# Patient Record
Sex: MALE | Race: BLACK OR AFRICAN AMERICAN | NOT HISPANIC OR LATINO | Employment: FULL TIME | URBAN - METROPOLITAN AREA
[De-identification: names, ages, dates, MRNs, and addresses within clinical notes are randomized per-mention and may not be internally consistent; named-entity substitution may affect disease eponyms.]

---

## 2022-12-19 ENCOUNTER — OFFICE VISIT (OUTPATIENT)
Dept: URGENT CARE | Facility: CLINIC | Age: 67
End: 2022-12-19
Payer: OTHER MISCELLANEOUS

## 2022-12-19 VITALS
RESPIRATION RATE: 16 BRPM | HEART RATE: 77 BPM | HEIGHT: 74 IN | WEIGHT: 182.81 LBS | DIASTOLIC BLOOD PRESSURE: 63 MMHG | OXYGEN SATURATION: 100 % | TEMPERATURE: 97 F | SYSTOLIC BLOOD PRESSURE: 96 MMHG | BODY MASS INDEX: 23.46 KG/M2

## 2022-12-19 DIAGNOSIS — Z02.6 ENCOUNTER RELATED TO WORKER'S COMPENSATION CLAIM: Primary | ICD-10-CM

## 2022-12-19 DIAGNOSIS — S63.612A SPRAIN OF RIGHT MIDDLE FINGER, UNSPECIFIED SITE OF DIGIT, INITIAL ENCOUNTER: ICD-10-CM

## 2022-12-19 DIAGNOSIS — M79.644 PAIN OF FINGER OF RIGHT HAND: ICD-10-CM

## 2022-12-19 LAB
COLLECTION ONLY: NORMAL
CTP QC/QA: YES
POC 10 PANEL DRUG SCREEN: NEGATIVE

## 2022-12-19 PROCEDURE — 99213 PR OFFICE/OUTPT VISIT, EST, LEVL III, 20-29 MIN: ICD-10-PCS | Mod: S$GLB,,,

## 2022-12-19 PROCEDURE — 80305 DRUG TEST PRSMV DIR OPT OBS: CPT | Mod: QW,S$GLB,,

## 2022-12-19 PROCEDURE — 99213 OFFICE O/P EST LOW 20 MIN: CPT | Mod: S$GLB,,,

## 2022-12-19 PROCEDURE — 73140 X-RAY EXAM OF FINGER(S): CPT | Mod: RT,S$GLB,, | Performed by: RADIOLOGY

## 2022-12-19 PROCEDURE — 73140 XR FINGER 2 OR MORE VIEWS RIGHT: ICD-10-PCS | Mod: RT,S$GLB,, | Performed by: RADIOLOGY

## 2022-12-19 PROCEDURE — 80305 PR DRUG TEST, PRESUMP,ANY NUMBER OF CLASS, DIRECT OPTICAL OBS: ICD-10-PCS | Mod: QW,S$GLB,,

## 2022-12-19 RX ORDER — MELOXICAM 7.5 MG/1
7.5 TABLET ORAL DAILY
Qty: 7 TABLET | Refills: 0 | Status: SHIPPED | OUTPATIENT
Start: 2022-12-19 | End: 2022-12-19

## 2022-12-19 RX ORDER — EZETIMIBE 10 MG/1
10 TABLET ORAL
COMMUNITY
Start: 2022-09-23

## 2022-12-19 RX ORDER — DICLOFENAC SODIUM 10 MG/G
2 GEL TOPICAL DAILY
Qty: 20 G | Refills: 0 | Status: SHIPPED | OUTPATIENT
Start: 2022-12-19 | End: 2022-12-26

## 2022-12-19 RX ORDER — RIVAROXABAN 2.5 MG/1
1 TABLET, FILM COATED ORAL 2 TIMES DAILY
COMMUNITY
Start: 2022-08-01

## 2022-12-19 RX ORDER — FERROUS SULFATE 325(65) MG
TABLET, DELAYED RELEASE (ENTERIC COATED) ORAL
COMMUNITY
Start: 2022-07-12

## 2022-12-19 NOTE — PROGRESS NOTES
"Subjective:       Patient ID: Rahul Landa Jr. is a 67 y.o. male.    Vitals:  height is 6' 2" (1.88 m) and weight is 82.9 kg (182 lb 12.8 oz). His oral temperature is 97 °F (36.1 °C). His blood pressure is 96/63 and his pulse is 77. His respiration is 16 and oxygen saturation is 100%.     Chief Complaint: Work Related Injury    W/C: DOI: 12-19-22 Initial Visit:  Pt states "Went to  a bucket and tried to let it go and right hand middle finger got stuck."      Constitution: Negative for activity change, appetite change, chills, sweating, fever and unexpected weight change.   HENT:  Negative for ear pain, postnasal drip, sinus pain, sinus pressure and sore throat.    Cardiovascular:  Negative for chest pain.   Eyes:  Negative for blurred vision.   Respiratory:  Negative for chest tightness, cough and shortness of breath.    Gastrointestinal:  Negative for abdominal pain.   Musculoskeletal:  Positive for pain (right middle finger pain).   Neurological:  Negative for dizziness, history of vertigo and altered mental status.   Psychiatric/Behavioral:  Negative for altered mental status.      Objective:      Physical Exam   Constitutional: He is oriented to person, place, and time.  Non-toxic appearance. He does not appear ill. No distress.   HENT:   Head: Normocephalic.   Eyes: Conjunctivae are normal. Extraocular movement intact   Cardiovascular: Normal rate and normal pulses.   Pulmonary/Chest: Effort normal and breath sounds normal. No respiratory distress. He has no wheezes. He has no rhonchi.   Neurological: no focal deficit. He is alert and oriented to person, place, and time.   Skin: Skin is not diaphoretic. Capillary refill takes 2 to 3 seconds.   Psychiatric: His behavior is normal. Mood normal.     Pt able to make fist with right hand, has pain with extension of right middle finger, right middle finger fds,fdp tendon intact and able to fully flexion, extensor tendon limited ability to fully straight " finger out due to increased pain, no obvious dislocation noted on exam, sensation intact, normal cap refill, no swelling noted.   Assessment:       1. Encounter related to worker's compensation claim    2. Pain of finger of right hand    3. Sprain of right middle finger, unspecified site of digit, initial encounter          Xray right middle finger: FINDINGS:  There is no fracture or dislocation.  Soft tissues are unremarkable.   Impression:No acute osseous abnormality  Plan:         Encounter related to worker's compensation claim  -     POCT Rapid Drug Screen 10 Panel    Pain of finger of right hand  -     X-Ray Finger 2 or More Views Right; Future; Expected date: 12/19/2022  -     Ambulatory referral/consult to Orthopedics  -     Discontinue: meloxicam (MOBIC) 7.5 MG tablet; Take 1 tablet (7.5 mg total) by mouth once daily. for 7 days  Dispense: 7 tablet; Refill: 0  -     diclofenac sodium (VOLTAREN) 1 % Gel; Apply 2 g topically once daily. for 7 days  Dispense: 20 g; Refill: 0    Sprain of right middle finger, unspecified site of digit, initial encounter  -     Ambulatory referral/consult to Orthopedics  -     Discontinue: meloxicam (MOBIC) 7.5 MG tablet; Take 1 tablet (7.5 mg total) by mouth once daily. for 7 days  Dispense: 7 tablet; Refill: 0  -     diclofenac sodium (VOLTAREN) 1 % Gel; Apply 2 g topically once daily. for 7 days  Dispense: 20 g; Refill: 0    Pt presents with right middle finger pain and difficulty with fully extension of right middle finger, states he was lifting buckets of paint earlier and noticed he had limited ROM of middle finger after, upon exam he is able to fully flex middle finger and make fist, he cannot fully extend middle finger, low suspicion for jersey finger or mallet finger, finger placed in finger splint for comfort, will do a trail of RICE and Voltaren gel, pt will return to clinic on 12/23/2022 to re-evaluate, ortho hand referral placed If symptoms persist.

## 2022-12-19 NOTE — LETTER
[facility]  [address] [address]  Phone: [phone]  [place] [employer]: [phone]    Pt Name: Rahul Landa Jr.  Injury Date: 12/19/2022   Employee ID:     Company: [employer]      Appointment Time: 10:50 AM Arrived: 11:10   Provider: Kar Manzano NP Time Out: 1:30     Office Treatment:   1. Encounter related to worker's compensation claim    2. Pain of finger of right hand    3. Sprain of right middle finger, unspecified site of digit, initial encounter      Medications Ordered This Encounter   Medications    diclofenac sodium (VOLTAREN) 1 % Gel      Patient Instructions: Use splint as directed, Apply ice 24-48 hours then apply heat/warm soaks, Elevated affected area, Attention not to aggravate affected area    Restrictions: No lifting/pushing/pulling more than 10 lbs, Limited use of right hand and arm     Return Appointment: 12/23/2022

## 2022-12-19 NOTE — PATIENT INSTRUCTIONS
Wear finger splint as needed for pain relief.   Rest, ice,elevate right finger.  Attention not to aggravate.   No lifting greater than 10 lbs with right hand/arm.  Return to clinic on 12/23/2022 to re-evaluate. Sooner if symptoms worsen.  Follow up with orthopedics as directed.

## 2023-08-30 ENCOUNTER — OFFICE VISIT (OUTPATIENT)
Dept: PRIMARY CARE CLINIC | Facility: CLINIC | Age: 68
End: 2023-08-30
Payer: COMMERCIAL

## 2023-08-30 VITALS
TEMPERATURE: 98 F | SYSTOLIC BLOOD PRESSURE: 141 MMHG | HEART RATE: 58 BPM | BODY MASS INDEX: 22.93 KG/M2 | OXYGEN SATURATION: 99 % | DIASTOLIC BLOOD PRESSURE: 84 MMHG | WEIGHT: 178.69 LBS | HEIGHT: 74 IN

## 2023-08-30 DIAGNOSIS — J01.00 ACUTE MAXILLARY SINUSITIS, RECURRENCE NOT SPECIFIED: Primary | ICD-10-CM

## 2023-08-30 DIAGNOSIS — R05.9 COUGH IN ADULT: ICD-10-CM

## 2023-08-30 PROCEDURE — 99213 OFFICE O/P EST LOW 20 MIN: CPT | Mod: S$GLB,,, | Performed by: NURSE PRACTITIONER

## 2023-08-30 PROCEDURE — 99213 PR OFFICE/OUTPT VISIT, EST, LEVL III, 20-29 MIN: ICD-10-PCS | Mod: S$GLB,,, | Performed by: NURSE PRACTITIONER

## 2023-08-30 PROCEDURE — 99999 PR PBB SHADOW E&M-EST. PATIENT-LVL IV: ICD-10-PCS | Mod: PBBFAC,,, | Performed by: NURSE PRACTITIONER

## 2023-08-30 PROCEDURE — 99999 PR PBB SHADOW E&M-EST. PATIENT-LVL IV: CPT | Mod: PBBFAC,,, | Performed by: NURSE PRACTITIONER

## 2023-08-30 RX ORDER — CETIRIZINE HYDROCHLORIDE 10 MG/1
10 TABLET ORAL NIGHTLY
Qty: 30 TABLET | Refills: 0 | Status: SHIPPED | OUTPATIENT
Start: 2023-08-30 | End: 2023-09-29

## 2023-08-30 RX ORDER — COVID-19 ANTIGEN TEST
1 KIT MISCELLANEOUS
Qty: 4 EACH | Refills: 1 | Status: SHIPPED | OUTPATIENT
Start: 2023-08-30

## 2023-08-30 RX ORDER — BENZONATATE 200 MG/1
200 CAPSULE ORAL 3 TIMES DAILY PRN
Qty: 20 CAPSULE | Refills: 0 | Status: SHIPPED | OUTPATIENT
Start: 2023-08-30 | End: 2023-09-09

## 2023-08-30 RX ORDER — AMOXICILLIN AND CLAVULANATE POTASSIUM 875; 125 MG/1; MG/1
1 TABLET, FILM COATED ORAL 2 TIMES DAILY
Qty: 10 TABLET | Refills: 0 | Status: SHIPPED | OUTPATIENT
Start: 2023-08-30 | End: 2023-09-04

## 2023-08-30 NOTE — PROGRESS NOTES
Subjective:       Patient ID: Rahul Landa Jr. is a 68 y.o. male.    Chief Complaint: Chest Congestion and Cough (/)    Mr. Rahul Landa is a 68 year old male, new to me, presents to the clinic with congestion and cough. No PCP. Medical and surgical history in addition to problem list reviewed as listed below.     Sinusitis  This is a new problem. The current episode started 1 to 4 weeks ago (negative Covid test on Monday, works at facility with elderly population). The problem has been gradually worsening since onset. There has been no fever. His pain is at a severity of 7/10. The pain is moderate. Associated symptoms include congestion, ear pain, a hoarse voice, neck pain, shortness of breath, sinus pressure, sneezing and a sore throat. Pertinent negatives include no headaches. Treatments tried: mucinex. The treatment provided no relief.       Past Medical History:   Diagnosis Date    Hypertension         History reviewed. No pertinent surgical history.     Family History   Problem Relation Age of Onset    Hypertension Mother     Diabetes Father        Social History     Tobacco Use   Smoking Status Light Smoker   Smokeless Tobacco Not on file       Social History     Social History Narrative    Not on file       Review of patient's allergies indicates:  No Known Allergies     Review of Systems   Constitutional:  Positive for fatigue.   HENT:  Positive for congestion, ear pain, hoarse voice, sinus pressure, sneezing and sore throat.    Respiratory:  Positive for shortness of breath.    Cardiovascular:  Negative for palpitations.   Gastrointestinal:  Negative for constipation, diarrhea, nausea and vomiting.   Musculoskeletal:  Positive for neck pain.   Neurological:  Negative for dizziness, numbness and headaches.         Objective:      Vitals:    08/30/23 1357   BP: (!) 141/84   BP Location: Left arm   Patient Position: Sitting   BP Method: Small (Automatic)   Pulse: (!) 58   Temp: 98.4 °F (36.9 °C)  "  TempSrc: Oral   SpO2: 99%   Weight: 81.1 kg (178 lb 10.9 oz)   Height: 6' 2" (1.88 m)        Physical Exam  Constitutional:       Appearance: He is ill-appearing.   HENT:      Nose: Congestion and rhinorrhea present.      Right Turbinates: Swollen.      Left Turbinates: Swollen.      Right Sinus: Maxillary sinus tenderness present.      Left Sinus: Maxillary sinus tenderness present.   Pulmonary:      Effort: Pulmonary effort is normal. No respiratory distress.      Breath sounds: No wheezing or rales.   Musculoskeletal:         General: Normal range of motion.      Cervical back: Normal range of motion.   Skin:     General: Skin is warm and dry.      Capillary Refill: Capillary refill takes 2 to 3 seconds.   Neurological:      Mental Status: He is alert and oriented to person, place, and time.         Assessment:       1. Acute maxillary sinusitis, recurrence not specified    2. Cough in adult        Plan:       Acute maxillary sinusitis, recurrence not specified  -     amoxicillin-clavulanate 875-125mg (AUGMENTIN) 875-125 mg per tablet; Take 1 tablet by mouth 2 (two) times daily. for 5 days  Dispense: 10 tablet; Refill: 0  -     cetirizine (ZYRTEC) 10 MG tablet; Take 1 tablet (10 mg total) by mouth every evening.  Dispense: 30 tablet; Refill: 0    Cough in adult  -     benzonatate (TESSALON) 200 MG capsule; Take 1 capsule (200 mg total) by mouth 3 (three) times daily as needed for Cough.  Dispense: 20 capsule; Refill: 0    Other orders  -     COVID-19 antigen test (COVID-19 AT-HOME TEST) Kit; 1 Application by Misc.(Non-Drug; Combo Route) route as needed (covid testing).  Dispense: 4 each; Refill: 1    Recommend swabbing at home for Covid once test retrieved from pharmacy with Covid on the rise and daily population interaction.    Medication List with Changes/Refills   New Medications    AMOXICILLIN-CLAVULANATE 875-125MG (AUGMENTIN) 875-125 MG PER TABLET    Take 1 tablet by mouth 2 (two) times daily. for 5 days "    BENZONATATE (TESSALON) 200 MG CAPSULE    Take 1 capsule (200 mg total) by mouth 3 (three) times daily as needed for Cough.    CETIRIZINE (ZYRTEC) 10 MG TABLET    Take 1 tablet (10 mg total) by mouth every evening.    COVID-19 ANTIGEN TEST (COVID-19 AT-HOME TEST) KIT    1 Application by Misc.(Non-Drug; Combo Route) route as needed (covid testing).   Current Medications    ASPIRIN (ECOTRIN) 81 MG EC TABLET    Take 1 tablet (81 mg total) by mouth once daily.    ATORVASTATIN (LIPITOR) 40 MG TABLET    Take 1 tablet (40 mg total) by mouth once daily.    BACLOFEN (LIORESAL) 10 MG TABLET    Take 10 mg by mouth 3 (three) times daily.    DICLOFENAC SODIUM (VOLTAREN) 1 % GEL    Apply 2 g topically once daily. for 7 days    DULOXETINE (CYMBALTA) 20 MG CAPSULE    Take 20 mg by mouth once daily.    EZETIMIBE (ZETIA) 10 MG TABLET    Take 10 mg by mouth.    FERROUS SULFATE 325 (65 FE) MG EC TABLET    Take by mouth.    VALSARTAN (DIOVAN) 80 MG TABLET    Take 80 mg by mouth once daily.    XARELTO 2.5 MG TAB    Take 1 tablet by mouth 2 (two) times daily.        Follow up if symptoms worsen or fail to improve.    NORMA Nino, MSN, FNP-C

## 2023-12-14 ENCOUNTER — OFFICE VISIT (OUTPATIENT)
Dept: URGENT CARE | Facility: CLINIC | Age: 68
End: 2023-12-14
Payer: COMMERCIAL

## 2023-12-14 VITALS
RESPIRATION RATE: 16 BRPM | SYSTOLIC BLOOD PRESSURE: 140 MMHG | WEIGHT: 175.63 LBS | DIASTOLIC BLOOD PRESSURE: 74 MMHG | TEMPERATURE: 97 F | HEIGHT: 74 IN | HEART RATE: 59 BPM | OXYGEN SATURATION: 97 % | BODY MASS INDEX: 22.54 KG/M2

## 2023-12-14 DIAGNOSIS — M54.50 ACUTE MIDLINE LOW BACK PAIN WITHOUT SCIATICA: Primary | ICD-10-CM

## 2023-12-14 PROCEDURE — 96372 THER/PROPH/DIAG INJ SC/IM: CPT | Mod: S$GLB,,,

## 2023-12-14 PROCEDURE — 96372 PR INJECTION,THERAP/PROPH/DIAG2ST, IM OR SUBCUT: ICD-10-PCS | Mod: S$GLB,,,

## 2023-12-14 PROCEDURE — 99214 OFFICE O/P EST MOD 30 MIN: CPT | Mod: 25,S$GLB,,

## 2023-12-14 PROCEDURE — 99214 PR OFFICE/OUTPT VISIT, EST, LEVL IV, 30-39 MIN: ICD-10-PCS | Mod: 25,S$GLB,,

## 2023-12-14 RX ORDER — DEXAMETHASONE SODIUM PHOSPHATE 4 MG/ML
8 INJECTION, SOLUTION INTRA-ARTICULAR; INTRALESIONAL; INTRAMUSCULAR; INTRAVENOUS; SOFT TISSUE
Status: COMPLETED | OUTPATIENT
Start: 2023-12-14 | End: 2023-12-14

## 2023-12-14 RX ORDER — KETOROLAC TROMETHAMINE 30 MG/ML
30 INJECTION, SOLUTION INTRAMUSCULAR; INTRAVENOUS
Status: COMPLETED | OUTPATIENT
Start: 2023-12-14 | End: 2023-12-14

## 2023-12-14 RX ORDER — PREDNISONE 20 MG/1
20 TABLET ORAL 2 TIMES DAILY
Qty: 10 TABLET | Refills: 0 | Status: SHIPPED | OUTPATIENT
Start: 2023-12-14 | End: 2023-12-19

## 2023-12-14 RX ORDER — CYCLOBENZAPRINE HCL 10 MG
10 TABLET ORAL 3 TIMES DAILY PRN
Qty: 30 TABLET | Refills: 0 | Status: SHIPPED | OUTPATIENT
Start: 2023-12-14 | End: 2023-12-24

## 2023-12-14 RX ADMIN — DEXAMETHASONE SODIUM PHOSPHATE 8 MG: 4 INJECTION, SOLUTION INTRA-ARTICULAR; INTRALESIONAL; INTRAMUSCULAR; INTRAVENOUS; SOFT TISSUE at 04:12

## 2023-12-14 RX ADMIN — KETOROLAC TROMETHAMINE 30 MG: 30 INJECTION, SOLUTION INTRAMUSCULAR; INTRAVENOUS at 04:12

## 2023-12-14 NOTE — PATIENT INSTRUCTIONS
Begin steroids tomorrow  Take Tylenol or Motrin as needed for pain  Follow up with PCP if pain persist  ER precautions for any loss of bowel or bladder function or unable to walk

## 2023-12-14 NOTE — PROGRESS NOTES
"Subjective:      Patient ID: Rahul Landa Jr. is a 68 y.o. male.    Vitals:  height is 6' 2" (1.88 m) and weight is 79.7 kg (175 lb 9.6 oz). His oral temperature is 97.4 °F (36.3 °C). His blood pressure is 140/74 (abnormal) and his pulse is 59 (abnormal). His respiration is 16 and oxygen saturation is 97%.     Chief Complaint: Back Pain    Mr. Kay, past medical history of hypertension, presents to clinic with acute onset lower back pain.  Patient reports he was painting, as he was going to stand up he had a sudden sharp pain in his midline lower back.  Reports he has had similar injury in the past.  Reports previous injury was caused by carrying multiple 5 gal buckets of paint up steps.  He has never had a follow up from that injury.  Denies recurrent injuries.  Denies saddle paresthesias.  Denies bowel or bladder dysfunction.  Pain is worse when trying to get out of the seated position.  Denies pain radiating to lower extremities.    Back Pain  This is a new problem. The current episode started today. The problem has been gradually worsening since onset. The pain is present in the lumbar spine. The quality of the pain is described as aching. The pain does not radiate. The pain is at a severity of 10/10. The symptoms are aggravated by bending, position, standing and sitting. Pertinent negatives include no bladder incontinence, bowel incontinence, paresis, paresthesias, pelvic pain or perianal numbness. Treatments tried: Tylenol, Asprin. The treatment provided no relief.       Gastrointestinal:  Negative for bowel incontinence.   Genitourinary:  Negative for bladder incontinence and pelvic pain.   Musculoskeletal:  Positive for back pain, pain with walking and history of spine disorder.   Skin:  Negative for rash.      Objective:     Physical Exam   Constitutional: He is oriented to person, place, and time. He appears well-developed. He is cooperative.  Non-toxic appearance. He does not appear ill. No distress. " normal  HENT:   Head: Normocephalic and atraumatic.   Ears:   Right Ear: Hearing, tympanic membrane, external ear and ear canal normal.   Left Ear: Hearing, tympanic membrane, external ear and ear canal normal.   Nose: Nose normal. No mucosal edema or nasal deformity. No epistaxis. Right sinus exhibits no maxillary sinus tenderness and no frontal sinus tenderness. Left sinus exhibits no maxillary sinus tenderness and no frontal sinus tenderness.   Mouth/Throat: Uvula is midline, oropharynx is clear and moist and mucous membranes are normal. Mucous membranes are moist. No trismus in the jaw. Normal dentition. No uvula swelling. Oropharynx is clear.   Eyes: Conjunctivae and lids are normal. Pupils are equal, round, and reactive to light. Extraocular movement intact   Neck: Trachea normal and phonation normal. Neck supple.   Cardiovascular: Normal rate, regular rhythm, normal heart sounds and normal pulses.   Pulmonary/Chest: Effort normal and breath sounds normal.   Abdominal: Normal appearance. Soft. flat abdomen   Musculoskeletal:         General: Tenderness present.      Lumbar back: He exhibits decreased range of motion, bony tenderness and spasm.        Back:    Neurological: no focal deficit. He is alert, oriented to person, place, and time and at baseline. He exhibits normal muscle tone.   Skin: Skin is warm, dry and intact. Capillary refill takes 2 to 3 seconds.   Psychiatric: His speech is normal and behavior is normal. Mood, judgment and thought content normal.   Nursing note and vitals reviewed.      Assessment:     1. Acute midline low back pain without sciatica        Plan:       Acute midline low back pain without sciatica  -     ketorolac injection 30 mg  -     dexAMETHasone injection 8 mg  -     predniSONE (DELTASONE) 20 MG tablet; Take 1 tablet (20 mg total) by mouth 2 (two) times daily. for 5 days  Dispense: 10 tablet; Refill: 0  -     cyclobenzaprine (FLEXERIL) 10 MG tablet; Take 1 tablet (10 mg  total) by mouth 3 (three) times daily as needed for Muscle spasms.  Dispense: 30 tablet; Refill: 0

## 2024-06-14 ENCOUNTER — LAB VISIT (OUTPATIENT)
Dept: LAB | Facility: HOSPITAL | Age: 69
End: 2024-06-14
Attending: NURSE PRACTITIONER

## 2024-06-14 ENCOUNTER — OFFICE VISIT (OUTPATIENT)
Dept: FAMILY MEDICINE | Facility: CLINIC | Age: 69
End: 2024-06-14

## 2024-06-14 VITALS
HEIGHT: 74 IN | SYSTOLIC BLOOD PRESSURE: 104 MMHG | BODY MASS INDEX: 22.95 KG/M2 | OXYGEN SATURATION: 97 % | WEIGHT: 178.81 LBS | TEMPERATURE: 98 F | HEART RATE: 69 BPM | DIASTOLIC BLOOD PRESSURE: 70 MMHG

## 2024-06-14 DIAGNOSIS — Z01.818 PRE-OP EVALUATION: ICD-10-CM

## 2024-06-14 DIAGNOSIS — H26.9 CATARACT, UNSPECIFIED CATARACT TYPE, UNSPECIFIED LATERALITY: ICD-10-CM

## 2024-06-14 DIAGNOSIS — Z01.818 PRE-OP EVALUATION: Primary | ICD-10-CM

## 2024-06-14 LAB
ALBUMIN SERPL BCP-MCNC: 3.9 G/DL (ref 3.5–5.2)
ALP SERPL-CCNC: 57 U/L (ref 55–135)
ALT SERPL W/O P-5'-P-CCNC: 11 U/L (ref 10–44)
ANION GAP SERPL CALC-SCNC: 7 MMOL/L (ref 8–16)
AST SERPL-CCNC: 17 U/L (ref 10–40)
BASOPHILS # BLD AUTO: 0.06 K/UL (ref 0–0.2)
BASOPHILS NFR BLD: 1.5 % (ref 0–1.9)
BILIRUB SERPL-MCNC: 0.7 MG/DL (ref 0.1–1)
BUN SERPL-MCNC: 16 MG/DL (ref 8–23)
CALCIUM SERPL-MCNC: 9.6 MG/DL (ref 8.7–10.5)
CHLORIDE SERPL-SCNC: 105 MMOL/L (ref 95–110)
CO2 SERPL-SCNC: 25 MMOL/L (ref 23–29)
CREAT SERPL-MCNC: 1.3 MG/DL (ref 0.5–1.4)
DIFFERENTIAL METHOD BLD: ABNORMAL
EOSINOPHIL # BLD AUTO: 0.1 K/UL (ref 0–0.5)
EOSINOPHIL NFR BLD: 2 % (ref 0–8)
ERYTHROCYTE [DISTWIDTH] IN BLOOD BY AUTOMATED COUNT: 13.6 % (ref 11.5–14.5)
EST. GFR  (NO RACE VARIABLE): 58.7 ML/MIN/1.73 M^2
GLUCOSE SERPL-MCNC: 80 MG/DL (ref 70–110)
HCT VFR BLD AUTO: 39.7 % (ref 40–54)
HGB BLD-MCNC: 13.3 G/DL (ref 14–18)
IMM GRANULOCYTES # BLD AUTO: 0.01 K/UL (ref 0–0.04)
IMM GRANULOCYTES NFR BLD AUTO: 0.3 % (ref 0–0.5)
LYMPHOCYTES # BLD AUTO: 1.4 K/UL (ref 1–4.8)
LYMPHOCYTES NFR BLD: 34.8 % (ref 18–48)
MCH RBC QN AUTO: 30 PG (ref 27–31)
MCHC RBC AUTO-ENTMCNC: 33.5 G/DL (ref 32–36)
MCV RBC AUTO: 89 FL (ref 82–98)
MONOCYTES # BLD AUTO: 0.5 K/UL (ref 0.3–1)
MONOCYTES NFR BLD: 11.3 % (ref 4–15)
NEUTROPHILS # BLD AUTO: 2 K/UL (ref 1.8–7.7)
NEUTROPHILS NFR BLD: 50.1 % (ref 38–73)
NRBC BLD-RTO: 0 /100 WBC
PLATELET # BLD AUTO: 305 K/UL (ref 150–450)
PMV BLD AUTO: 9.2 FL (ref 9.2–12.9)
POTASSIUM SERPL-SCNC: 4.3 MMOL/L (ref 3.5–5.1)
PROT SERPL-MCNC: 7.6 G/DL (ref 6–8.4)
RBC # BLD AUTO: 4.44 M/UL (ref 4.6–6.2)
SODIUM SERPL-SCNC: 137 MMOL/L (ref 136–145)
WBC # BLD AUTO: 4 K/UL (ref 3.9–12.7)

## 2024-06-14 PROCEDURE — 99999 PR PBB SHADOW E&M-EST. PATIENT-LVL IV: CPT | Mod: PBBFAC,,, | Performed by: NURSE PRACTITIONER

## 2024-06-14 PROCEDURE — 99214 OFFICE O/P EST MOD 30 MIN: CPT | Mod: PBBFAC,PO | Performed by: NURSE PRACTITIONER

## 2024-06-14 PROCEDURE — 85025 COMPLETE CBC W/AUTO DIFF WBC: CPT | Performed by: NURSE PRACTITIONER

## 2024-06-14 PROCEDURE — 36415 COLL VENOUS BLD VENIPUNCTURE: CPT | Mod: PO | Performed by: NURSE PRACTITIONER

## 2024-06-14 PROCEDURE — 80053 COMPREHEN METABOLIC PANEL: CPT | Performed by: NURSE PRACTITIONER

## 2024-06-14 NOTE — PROGRESS NOTES
Subjective:       Patient ID: Rahul Landa Jr. is a 69 y.o. male.    Chief Complaint: Pre-op Exam     HPI   68 y/o male patient with medical problems listed below presents for pre op of right cataract surgery scheduled on 6/19/2024 at [facility] [place]. No acute complaints reported. Patient is new to the clinic. Patient  goes to [facility] for primary care, last seen by pcp approximately 6 months ago and states he is unsure of when was the last time he had labs done. States could not follow up with his pcp since the last visit since the pcp was not ?available in practice and may need new pcp in [place]. He lives in [place].     PMH: HTN, HLD  Current meds: aspirin 81 mg     HTN and HLD: States he was on medications in the past but has not taken the meds for several months    Labs reviewed   There is no problem list on file for this patient.     Review of patient's allergies indicates:  No Known Allergies    No past surgical history on file.       Current Outpatient Medications:     baclofen (LIORESAL) 10 MG tablet, Take 10 mg by mouth 3 (three) times daily., Disp: , Rfl:     duloxetine (CYMBALTA) 20 MG capsule, Take 20 mg by mouth once daily., Disp: , Rfl:     ezetimibe (ZETIA) 10 mg tablet, Take 10 mg by mouth., Disp: , Rfl:     ferrous sulfate 325 (65 FE) MG EC tablet, Take by mouth., Disp: , Rfl:     valsartan (DIOVAN) 80 MG tablet, Take 80 mg by mouth once daily., Disp: , Rfl:     XARELTO 2.5 mg Tab, Take 1 tablet by mouth 2 (two) times daily., Disp: , Rfl:     aspirin (ECOTRIN) 81 MG EC tablet, Take 1 tablet (81 mg total) by mouth once daily., Disp: 30 tablet, Rfl: 0    atorvastatin (LIPITOR) 40 MG tablet, Take 1 tablet (40 mg total) by mouth once daily., Disp: 30 tablet, Rfl: 0    cetirizine (ZYRTEC) 10 MG tablet, Take 1 tablet (10 mg total) by mouth every evening., Disp: 30 tablet, Rfl: 0    COVID-19 antigen test (COVID-19 AT-HOME TEST) Kit, 1 Application by Misc.(Non-Drug; Combo  "Route) route as needed (covid testing). (Patient not taking: Reported on 12/14/2023), Disp: 4 each, Rfl: 1    diclofenac sodium (VOLTAREN) 1 % Gel, Apply 2 g topically once daily. for 7 days, Disp: 20 g, Rfl: 0    Review of Systems   Constitutional:  Negative for chills and fever.   Respiratory:  Negative for cough and shortness of breath.    Cardiovascular:  Negative for chest pain and palpitations.   Gastrointestinal:  Negative for abdominal pain.   Neurological:  Negative for dizziness and headaches.       Objective:   /70 (BP Location: Left arm, Patient Position: Sitting, BP Method: Medium (Manual))   Pulse 69   Temp 97.8 °F (36.6 °C) (Oral)   Ht 6' 2" (1.88 m)   Wt 81.1 kg (178 lb 12.7 oz)   SpO2 97%   BMI 22.96 kg/m²         Physical Exam  Vitals reviewed.   Constitutional:       General: He is not in acute distress.     Appearance: Normal appearance.   Cardiovascular:      Rate and Rhythm: Normal rate and regular rhythm.      Pulses: Normal pulses.      Heart sounds: Normal heart sounds.   Pulmonary:      Effort: Pulmonary effort is normal.      Breath sounds: Normal breath sounds.   Chest:      Chest wall: No deformity, swelling or edema.   Abdominal:      General: Abdomen is flat. Bowel sounds are normal.      Palpations: Abdomen is soft.   Musculoskeletal:      Cervical back: Normal range of motion.   Neurological:      Mental Status: He is oriented to person, place, and time.         Assessment:       1. Pre-op evaluation    2. Cataract, unspecified cataract type, unspecified laterality        Plan:   1. Pre-op evaluation  - CBC Auto Differential; Future  - Comprehensive Metabolic Panel; Future  - Patient can proceed for the planned surgery   - Form brought in today completed and directly given to patient     2. Cataract, unspecified cataract type, unspecified laterality    Patient with be reevaluated in  follow up with pcp  or sooner bill Ramirez NP  "

## 2024-07-24 ENCOUNTER — TELEPHONE (OUTPATIENT)
Dept: FAMILY MEDICINE | Facility: CLINIC | Age: 69
End: 2024-07-24

## 2024-07-24 NOTE — TELEPHONE ENCOUNTER
Spoke with pt. Pt dropped of pre-op paperwork. Pre-op appt successfully scheduled for 7/29. Pt having cataract surgery 8/14.

## 2024-08-12 ENCOUNTER — TELEPHONE (OUTPATIENT)
Dept: FAMILY MEDICINE | Facility: CLINIC | Age: 69
End: 2024-08-12

## 2024-08-12 NOTE — TELEPHONE ENCOUNTER
JENN Martinez has surgery clearance paperwork.  Patient has an appointment with DENISSE Garcia tomorrow related to cataract surgery clearance.

## 2024-08-12 NOTE — TELEPHONE ENCOUNTER
----- Message from Kendy Slaughter sent at 8/12/2024 10:38 AM CDT -----  Hi patient dropped off paper work for surgery he would like for it to be faxed if possible I enclosed in envelope   1. I was told the name of the doctor(s) who took care of my child while in the hospital.    2. I have been told about any important findings on my child's plan of care.    3. The doctor clearly explained my child's diagnosis and other possible diagnoses that were considered.    4. My child's doctor explained all the tests that were done and their results (if available). I understand that some of the test results may not be ready before we go home and I was told how I can get these results. I understand that a summary of my child's hospitalization and important test results will be shared with my child's outpatient doctor.    5. My child's doctor talked to me about what I need to do when we go home.    6. I understand what signs and symptoms to watch for. I understand what symptoms I would need to call my doctor for and/or return to the hospital.    7. I have the phone number to call the hospital for results and/or questions after I leave the hospital.

## 2024-08-13 ENCOUNTER — OFFICE VISIT (OUTPATIENT)
Dept: FAMILY MEDICINE | Facility: CLINIC | Age: 69
End: 2024-08-13

## 2024-08-13 VITALS
DIASTOLIC BLOOD PRESSURE: 68 MMHG | HEART RATE: 59 BPM | OXYGEN SATURATION: 97 % | BODY MASS INDEX: 22.63 KG/M2 | WEIGHT: 176.38 LBS | TEMPERATURE: 98 F | HEIGHT: 74 IN | SYSTOLIC BLOOD PRESSURE: 108 MMHG

## 2024-08-13 DIAGNOSIS — I10 ESSENTIAL HYPERTENSION: ICD-10-CM

## 2024-08-13 DIAGNOSIS — H26.9 CATARACT, UNSPECIFIED CATARACT TYPE, UNSPECIFIED LATERALITY: ICD-10-CM

## 2024-08-13 DIAGNOSIS — Z01.818 PRE-OP EVALUATION: Primary | ICD-10-CM

## 2024-08-13 PROCEDURE — 99999 PR PBB SHADOW E&M-EST. PATIENT-LVL IV: CPT | Mod: PBBFAC,,, | Performed by: NURSE PRACTITIONER

## 2024-08-13 PROCEDURE — 99214 OFFICE O/P EST MOD 30 MIN: CPT | Mod: S$PBB,,, | Performed by: NURSE PRACTITIONER

## 2024-08-13 PROCEDURE — 99214 OFFICE O/P EST MOD 30 MIN: CPT | Mod: PBBFAC,PO | Performed by: NURSE PRACTITIONER

## 2024-08-13 NOTE — PROGRESS NOTES
Subjective:       Patient ID: Rahul Landa Jr. is a 69 y.o. male.    Chief Complaint: Pre-op Exam     HPI   70 y/o female patient with medical problems listed below presents for pre op of left cataract surgery scheduled on 8/14/2024 at [facility]. Patient brought in a form of pre op. Patient had right cataract surgery done on 6/19/2024 and states the surgery went well. Patient is new to the clinic. Patient states goes to [facility] for primary care, last seen by pcp approximately 8 months ago. States could not follow up with his pcp since the last visit since the pcp was not ?available in practice but will find new pcp at [facility]. He lives in [place].     PMH: HTN, HLD  Current meds: aspirin 81 mg      HTN and HLD: States he was on medications in the past but has not taken the meds for several months    Labs reviewed from 6/2024    Patient Active Problem List   Diagnosis    Essential hypertension      Review of patient's allergies indicates:  No Known Allergies    No past surgical history on file.       Current Outpatient Medications:     baclofen (LIORESAL) 10 MG tablet, Take 10 mg by mouth 3 (three) times daily., Disp: , Rfl:     duloxetine (CYMBALTA) 20 MG capsule, Take 20 mg by mouth once daily., Disp: , Rfl:     ezetimibe (ZETIA) 10 mg tablet, Take 10 mg by mouth., Disp: , Rfl:     ferrous sulfate 325 (65 FE) MG EC tablet, Take by mouth., Disp: , Rfl:     valsartan (DIOVAN) 80 MG tablet, Take 80 mg by mouth once daily., Disp: , Rfl:     XARELTO 2.5 mg Tab, Take 1 tablet by mouth 2 (two) times daily., Disp: , Rfl:     aspirin (ECOTRIN) 81 MG EC tablet, Take 1 tablet (81 mg total) by mouth once daily., Disp: 30 tablet, Rfl: 0    atorvastatin (LIPITOR) 40 MG tablet, Take 1 tablet (40 mg total) by mouth once daily., Disp: 30 tablet, Rfl: 0    cetirizine (ZYRTEC) 10 MG tablet, Take 1 tablet (10 mg total) by mouth every evening., Disp: 30 tablet, Rfl: 0    COVID-19 antigen test (COVID-19  "AT-HOME TEST) Kit, 1 Application by Misc.(Non-Drug; Combo Route) route as needed (covid testing). (Patient not taking: Reported on 12/14/2023), Disp: 4 each, Rfl: 1    diclofenac sodium (VOLTAREN) 1 % Gel, Apply 2 g topically once daily. for 7 days, Disp: 20 g, Rfl: 0    Review of Systems   Constitutional:  Negative for chills and fever.   Respiratory:  Negative for cough, chest tightness and shortness of breath.    Cardiovascular:  Negative for chest pain and palpitations.   Gastrointestinal:  Negative for abdominal pain.   Neurological:  Negative for dizziness and headaches.       Objective:   /68 (BP Location: Right arm, Patient Position: Sitting, BP Method: Medium (Manual))   Pulse (!) 59   Temp 98 °F (36.7 °C) (Oral)   Ht 6' 2" (1.88 m)   Wt 80 kg (176 lb 5.9 oz)   SpO2 97%   BMI 22.64 kg/m²         Physical Exam  Vitals reviewed.   Constitutional:       General: He is not in acute distress.     Appearance: Normal appearance.   Cardiovascular:      Rate and Rhythm: Normal rate and regular rhythm.      Pulses: Normal pulses.      Heart sounds: Normal heart sounds.   Pulmonary:      Effort: Pulmonary effort is normal.      Breath sounds: Normal breath sounds.   Chest:      Chest wall: No deformity, swelling or edema.   Abdominal:      General: Abdomen is flat. Bowel sounds are normal.      Palpations: Abdomen is soft.   Musculoskeletal:      Cervical back: Normal range of motion.   Neurological:      Mental Status: He is oriented to person, place, and time.         Assessment:       1. Pre-op evaluation    2. Cataract, unspecified cataract type, unspecified laterality    3. Essential hypertension        Plan:       1. Pre-op evaluation  - patient can proceed for the planned surgery  - pre op form completed and directly given to patient     2. Cataract, unspecified cataract type, unspecified laterality    3. Essential hypertension  Controlled   - not on antihypertensive agent    Discussed, advised to " follow up with pcp, patient reports unsure of if the pcp left the practice. Offered to establish care with md in [place] but patient reports will find new pcp at [facility]     Patient with be reevaluated in  follow up with pcp  or sooner bill Ramirez NP

## 2024-08-14 ENCOUNTER — TELEPHONE (OUTPATIENT)
Dept: FAMILY MEDICINE | Facility: CLINIC | Age: 69
End: 2024-08-14

## 2024-08-14 NOTE — TELEPHONE ENCOUNTER
Surgery clearance for completed at time of office visit and given directly to patient by NP Alethea Garcia.  Please see the following documentation from visit on 8-13-24:       Plan:      Plan  1. Pre-op evaluation  - patient can proceed for the planned surgery  - pre op form completed and directly given to patient      2. Cataract, unspecified cataract type, unspecified laterality     3. Essential hypertension  Controlled   - not on antihypertensive agent     Discussed, advised to follow up with pcp, patient reports unsure of if the pcp left the practice. Offered to establish care with md in [place] but patient reports will find new pcp at [facility]      Patient with be reevaluated in  follow up with pcp  or sooner bill Ramirez NP      Call placed to patient who states he submitted clearance form to surgeon's office.  Patient states he gave the form to the .  Call placed to Donya with Dr Quesada's office.  No answer received.  Left detailed message on answering machine.

## 2024-08-14 NOTE — TELEPHONE ENCOUNTER
Dale Burciaga Staff     ----- Message from Dale Burciaga sent at 8/14/2024  8:23 AM CDT -----  Regarding: clearance  Contact: eye surgery  Type:  Needs Medical Advice    Who Called: Donya oliveira/ Dr Quesada       Would the patient rather a call back or a response via [facility]? Call back    Best Call Back Number: [phone]    [phone]    Additional Information: Sts they were checking on the clearance for the SX that pt came in for yesterday states that it is urgent.    Please advise -- Thank you